# Patient Record
Sex: FEMALE | Race: WHITE
[De-identification: names, ages, dates, MRNs, and addresses within clinical notes are randomized per-mention and may not be internally consistent; named-entity substitution may affect disease eponyms.]

---

## 2018-10-17 ENCOUNTER — HOSPITAL ENCOUNTER (EMERGENCY)
Dept: HOSPITAL 63 - ER | Age: 43
Discharge: HOME | End: 2018-10-17
Payer: OTHER GOVERNMENT

## 2018-10-17 VITALS — HEIGHT: 66 IN | BODY MASS INDEX: 34.47 KG/M2 | WEIGHT: 214.51 LBS

## 2018-10-17 VITALS — SYSTOLIC BLOOD PRESSURE: 120 MMHG | DIASTOLIC BLOOD PRESSURE: 92 MMHG

## 2018-10-17 DIAGNOSIS — Z88.5: ICD-10-CM

## 2018-10-17 DIAGNOSIS — R09.89: Primary | ICD-10-CM

## 2018-10-17 DIAGNOSIS — F41.9: ICD-10-CM

## 2018-10-17 DIAGNOSIS — K21.9: ICD-10-CM

## 2018-10-17 DIAGNOSIS — R07.0: ICD-10-CM

## 2018-10-17 PROCEDURE — 70360 X-RAY EXAM OF NECK: CPT

## 2018-10-17 PROCEDURE — 99284 EMERGENCY DEPT VISIT MOD MDM: CPT

## 2018-10-17 RX ADMIN — Medication ONE ML: at 16:47

## 2018-10-17 RX ADMIN — Medication ONE ML: at 16:46

## 2018-10-17 NOTE — PHYS DOC
Past History


Past Medical History:  GERD, Other


Past Surgical History:  Appendectomy, Cholecystectomy, , Other


Alcohol Use:  None


Drug Use:  None





Adult General


Chief Complaint


Chief Complaint:  SORE THROAT





HPI


HPI





Patient is a 43 year old female who presents with staining of pain in her 

throat and feeling of food stuck in her throat since last night after eating 

chicken and mashed potato. Patient states she is able to swallow her saliva and 

liquids but feeling discomfort feeling in her throat. Patient denies history of 

food bolus.





Review of Systems


Review of Systems





Constitutional: Denies fever or chills []


Eyes: Denies change in visual acuity, redness, or eye pain []


HENT: Denies nasal congestion or sore throat []


Respiratory: Denies cough or shortness of breath []


Cardiovascular: No additional information not addressed in HPI []


GI: Denies abdominal pain, nausea, vomiting, bloody stools or diarrhea []


: Denies dysuria or hematuria []


Musculoskeletal: Denies back pain or joint pain []


Integument: Denies rash or skin lesions []


Neurologic: Denies headache, focal weakness or sensory changes []


Endocrine: Denies polyuria or polydipsia []





All other systems were reviewed and found to be within normal limits, except as 

documented in this note.





Current Medications


Current Medications





Current Medications








 Medications


  (Trade)  Dose


 Ordered  Sig/Jose  Start Time


 Stop Time Status Last Admin


Dose Admin


 


 Multi-Ingredient


 Mouthwash/Gargle


  (Gi Cocktail)  20 ml  1X  ONCE  10/17/18 17:00


 10/17/18 17:01 DC  














Allergies


Allergies





Allergies








Coded Allergies Type Severity Reaction Last Updated Verified


 


  amoxicillin Allergy Unknown  10/17/18 Yes


 


  clavulanic acid Allergy Unknown  10/17/18 Yes


 


  codeine Allergy Unknown  10/17/18 Yes











Physical Exam


Physical Exam





Constitutional: Well developed, well nourished, mild distress, non-toxic 

appearance, very anxious. []


HENT: Normocephalic, atraumatic, bilateral external ears normal, oropharynx 

moist, no oral exudates, nose normal. []


Eyes: PERRLA, EOMI, conjunctiva normal, no discharge. [] 


Neck: Normal range of motion, no tenderness, supple, no stridor. [] 


Cardiovascular:Heart rate regular rhythm, no murmur []


Lungs & Thorax:  Bilateral breath sounds clear to auscultation []


Abdomen: Bowel sounds normal, soft, no tenderness, no masses, no pulsatile 

masses. [] 


Skin: Warm, dry, no erythema, no rash. [] 


Back: No tenderness, no CVA tenderness. [] 


Extremities: No tenderness, no cyanosis, no clubbing, ROM intact, no edema. [] 


Neurologic: Alert and oriented X 3, normal motor function, normal sensory 

function, no focal deficits noted. []


Psychologic: Affect anxious, judgement normal, mood normal. []





Current Patient Data


Vital Signs





 Vital Signs








  Date Time  Temp Pulse Resp B/P (MAP) Pulse Ox O2 Delivery O2 Flow Rate FiO2


 


10/17/18 16:13 97.3 118 16  98 Room Air  











EKG


EKG


[]





Radiology/Procedures


Radiology/Procedures


 [facility] [address], [place] [address]


 [phone]


 


 IMAGING REPORT





 Signed





PATIENT: CHRIS PLAZA ACCOUNT: [id number] MRN#: [id number]


: 1975 LOCATION: ER AGE: 43


SEX: F EXAM DT: 10/17/18 ACCESSION#: [id number]


STATUS: REG ER ORD. PHYSICIAN: GRIFFIN PINO MD 


REASON: foreign body sensation


PROCEDURE: NECK SOFT TISSUE





History:  Foreign body sensation of food from last night around jugular 


notch.


 


Comparison:  None.


 


Findings:


 


AP and lateral views of the neck with attention to the soft tissues.  No 


radiopaque foreign body is seen.  No focal soft tissue swelling is 


appreciated.  Airway appears patent.  There is straightening of the normal


cervical lordosis.  Degenerative disc disease is seen at C5-6.


 


Impression:


No acute radiographic abnormality identified.


 


Electronically signed by: Jed Mckinnon MD (10/17/2018 5:05 PM) [facility]














DICTATED AND SIGNED BY:     JED MCKINNON MD


DATE:     10/17/18 5627





CC: GRIFFIN PINO MD; PCP,NO ~





Course & Med Decision Making


Course & Med Decision Making


Pertinent  Imaging studies reviewed. (See chart for details)





Evaluation of patient in ER showed 43-year-old female patient with complaining 

of esophageal foreign body sensation since last night. Patient was able to 

swallow her saliva and liquids. Patient refused to take GI cocktail in ER. 

Patient was anxious and walking in the room and looking at cardiac monitor 

constantly. Patient informed about negative evaluation for food bolus and needs 

to take liquid diet and over-the-counter Maalox.





Dragon Disclaimer


Dragon Disclaimer


This electronic medical record was generated, in whole or in part, using a 

voice recognition dictation system.





Departure


Departure:


Impression:  


 Primary Impression:  


 Sensation of foreign body in esophagus


 Additional Impression:  


 Anxiety


Disposition:   HOME, SELF-CARE (at 1719)


Condition:  STABLE


Referrals:  


PCP,NO (PCP)


Patient Instructions:  Dysphagia, Dysphagia Diet Level 1, Pureed





Additional Instructions:  


Drink plenty of liquids


Follow-up with your primary care physician in 3-5 days


Return to ER if not getting better


Take over-the-counter Maalox as needed for pain





Problem Qualifiers











GRIFFIN PINO MD Oct 17, 2018 17:20

## 2018-10-17 NOTE — RAD
History:  Foreign body sensation of food from last night around jugular 

notch.

 

Comparison:  None.

 

Findings:

 

AP and lateral views of the neck with attention to the soft tissues.  No 

radiopaque foreign body is seen.  No focal soft tissue swelling is 

appreciated.  Airway appears patent.  There is straightening of the normal

cervical lordosis.  Degenerative disc disease is seen at C5-6.

 

Impression:

No acute radiographic abnormality identified.

 

Electronically signed by: Jed Mckinnon MD (10/17/2018 5:05 PM) [facility]

## 2019-10-26 ENCOUNTER — HOSPITAL ENCOUNTER (EMERGENCY)
Dept: HOSPITAL 63 - ER | Age: 44
Discharge: HOME | End: 2019-10-26
Payer: OTHER GOVERNMENT

## 2019-10-26 VITALS
SYSTOLIC BLOOD PRESSURE: 131 MMHG | BODY MASS INDEX: 34.47 KG/M2 | HEIGHT: 66 IN | DIASTOLIC BLOOD PRESSURE: 90 MMHG | WEIGHT: 214.51 LBS

## 2019-10-26 DIAGNOSIS — H69.82: Primary | ICD-10-CM

## 2019-10-26 DIAGNOSIS — I88.9: ICD-10-CM

## 2019-10-26 DIAGNOSIS — Z88.5: ICD-10-CM

## 2019-10-26 DIAGNOSIS — K21.9: ICD-10-CM

## 2019-10-26 DIAGNOSIS — Z88.1: ICD-10-CM

## 2019-10-26 DIAGNOSIS — Z90.89: ICD-10-CM

## 2019-10-26 PROCEDURE — 99283 EMERGENCY DEPT VISIT LOW MDM: CPT

## 2019-10-26 NOTE — PHYS DOC
Past History


Past Medical History:  GERD, Other


Past Surgical History:  Appendectomy, Cholecystectomy, , Other


Alcohol Use:  None


Drug Use:  None





Adult General


Chief Complaint


Chief Complaint:  EARACHE/EAR PAIN





HPI


HPI


Patient is a 44-year-old female who presents with complaint of left ear pain and

muffled hearing for the last few days. She also indicates that she has some 

swelling in her left lower jaw and it is tender in that area. She denies any 

actual toothache however. Patient denies any fever. She rates pain as being 

moderate. She states that she came in because she is going on a plane trip and a

few days and wants to make sure that she is feeling better by then.[]





Review of Systems


Review of Systems





Constitutional: Denies fever or chills []


HENT: Positive left ear pain[]


Respiratory: Denies cough or shortness of breath []


Cardiovascular: No additional information not addressed in HPI []


Integument: Denies rash or skin lesions []


Neurologic: Denies headache, focal weakness or sensory changes []





Allergies


Allergies





Allergies








Coded Allergies Type Severity Reaction Last Updated Verified


 


  amoxicillin Allergy Unknown  10/17/18 Yes


 


  clavulanic acid Allergy Unknown  10/17/18 Yes


 


  codeine Allergy Unknown  10/17/18 Yes











Physical Exam


Physical Exam





Constitutional: Well developed, well nourished, no acute distress, non-toxic 

appearance. []


HENT: Normocephalic, atraumatic, left TM is slightly retracted with small fluid 

level, oropharynx moist, no oral exudates, nose normal. []


Neck: Normal range of motion, no tenderness, supple, with left sided posterior 

cervical adenopathy that is tender. [] 


Cardiovascular: Regular rate and rhythm[]


Lungs & Thorax:  Bilateral breath sounds clear to auscultation []


Abdomen: Bowel sounds normal, soft, no tenderness. [] 


Skin: Warm, dry, no erythema, no rash. [] 


Neurologic: Alert and oriented X 3, no focal deficits noted. []





EKG


EKG


[]





Radiology/Procedures


Radiology/Procedures


[]





Course & Med Decision Making


Course & Med Decision Making


Pertinent Labs and Imaging studies reviewed. (See chart for details)





[]





Dragon Disclaimer


Dragon Disclaimer


This electronic medical record was generated, in whole or in part, using a voice

 recognition dictation system.





Departure


Departure:


Impression:  


   Primary Impression:  


   Otalgia of left ear


   Additional Impressions:  


   Acute dysfunction of left eustachian tube


   Lymphadenitis


Disposition:  01 HOME, SELF-CARE


Condition:  STABLE


Referrals:  


PCP,NO (PCP)


Patient Instructions:  Cervical Adenitis, Otalgia


Scripts


Triamcinolone Acetonide (NASACORT) 10.8 Ml [place]


2 SPR NS QHS for congestion, #10.8 ML 0 Refills


   Prov: JAYMIE RICHARD Jr. DO         10/26/19 


Methylprednisolone (MEDROL) 4 Mg Tab.ds.pk


1 PKG PO UD for inflammation, #1 PKG


   Prov: JAYMIE RICHARD Jr. DO         10/26/19 


Cefdinir (CEFDINIR) 300 Mg Capsule


1 CAP PO BID for infection, #20 CAP


   Prov: JAYMIE RICHARD Jr. DO         10/26/19





Problem Qualifiers











JAYMIE RICHARD Jr. DO          Oct 26, 2019 10:09

## 2019-11-07 ENCOUNTER — HOSPITAL ENCOUNTER (EMERGENCY)
Dept: HOSPITAL 63 - ER | Age: 44
Discharge: HOME | End: 2019-11-07
Payer: OTHER GOVERNMENT

## 2019-11-07 VITALS — SYSTOLIC BLOOD PRESSURE: 155 MMHG | DIASTOLIC BLOOD PRESSURE: 81 MMHG

## 2019-11-07 DIAGNOSIS — J06.9: Primary | ICD-10-CM

## 2019-11-07 DIAGNOSIS — B97.89: ICD-10-CM

## 2019-11-07 DIAGNOSIS — Z88.5: ICD-10-CM

## 2019-11-07 DIAGNOSIS — K21.9: ICD-10-CM

## 2019-11-07 DIAGNOSIS — Z88.1: ICD-10-CM

## 2019-11-07 DIAGNOSIS — I10: ICD-10-CM

## 2019-11-07 LAB
INFLUENZA A PATIENT: NEGATIVE
INFLUENZA B PATIENT: NEGATIVE

## 2019-11-07 PROCEDURE — 87804 INFLUENZA ASSAY W/OPTIC: CPT

## 2019-11-07 PROCEDURE — 71046 X-RAY EXAM CHEST 2 VIEWS: CPT

## 2019-11-07 PROCEDURE — 99285 EMERGENCY DEPT VISIT HI MDM: CPT

## 2019-11-07 NOTE — PHYS DOC
Past History


Past Medical History:  GERD, Hypertension, Other


Past Surgical History:  Appendectomy, Cholecystectomy, , Other


Alcohol Use:  None


Drug Use:  None





Adult General


Chief Complaint


Chief Complaint:  FLU SYMPTOM





HPI


HPI


44-year-old female presents with cough, body aches, hoarse voice for the last 2 

days. Patient was out of the country when she started to get sick. She was in 

[place]. She didn't get her flu shot 2 and half weeks ago. She has not had a 

measured fever at home.





Review of Systems


Review of Systems





Constitutional: Denies fever or chills []


Eyes: Denies change in visual acuity, redness, or eye pain []


HENT: Denies nasal congestion or sore throat []


Respiratory: Cough without shortness of breath []


Cardiovascular: No additional information not addressed in HPI []


GI: Denies abdominal pain, nausea, vomiting, bloody stools or diarrhea []


: Denies dysuria or hematuria []


Musculoskeletal: Denies back pain or joint pain []


Integument: Denies rash or skin lesions []


Neurologic: Denies headache, focal weakness or sensory changes []


Endocrine: Denies polyuria or polydipsia []





All other systems were reviewed and found to be within normal limits, except as 

documented in this note.





Allergies


Allergies





Allergies








Coded Allergies Type Severity Reaction Last Updated Verified


 


  amoxicillin Allergy Unknown  10/17/18 Yes


 


  clavulanic acid Allergy Unknown  10/17/18 Yes


 


  codeine Allergy Unknown  10/17/18 Yes











Physical Exam


Physical Exam





Constitutional: Well developed, obese, well nourished, no acute distress, non-

toxic appearance. []


HENT: Normocephalic, atraumatic, bilateral external ears normal, oropharynx 

moist, no oral exudates, nose normal. Worse voice []


Eyes: PERRLA, EOMI, conjunctiva normal, no discharge. [] 


Neck: Normal range of motion, no tenderness, supple, no stridor. [] 


Cardiovascular: Heart rate regular rhythm, no murmur []


Lungs & Thorax:  Bilateral breath sounds clear to auscultation []


Abdomen: Bowel sounds normal, soft, no tenderness, no masses, no pulsatile 

masses. [] 


Skin: Warm, dry, no erythema, no rash. [] 


Back: No tenderness, no CVA tenderness. [] 


Extremities: No tenderness, no cyanosis, no clubbing, ROM intact, no edema. [] 


Neurologic: Alert and oriented X 3, normal motor function, normal sensory 

function, no focal deficits noted. []


Psychologic: Affect normal, judgement normal, mood normal. []





EKG


EKG


[]





Radiology/Procedures


Radiology/Procedures


[]


Impressions:


CHEST PA   LATERAL


 


History: Fever cough


 


Comparison: None.


 


Findings:


No consolidation or pleural effusion. Normal heart size. Surgical clips 


upper abdomen.


 


Impression: 


1.  No acute cardiopulmonary process.


 


Electronically signed by: Parvin Andino DO (2019 4:57 PM) [facility]














DICTATED AND SIGNED BY:     PARVIN ANDINO DO


DATE:     19





CC: EVA CRAWFORD DO; PCP,BAYLEE ~





Course & Med Decision Making


Course & Med Decision Making


Pertinent Labs and Imaging studies reviewed. (See chart for details)


The patient's x-ray is negative for pneumonia. Her influenza was negative. This 

is likely viral URI with cough. I have advised supportive care. She is stable 

for discharge at this time.


[]





Dragon Disclaimer


Dragon Disclaimer


This electronic medical record was generated, in whole or in part, using a voice

 recognition dictation system.





Departure


Departure:


Impression:  


   Primary Impression:  


   Viral URI with cough


Disposition:   HOME, SELF-CARE


Condition:  STABLE


Referrals:  


PCPBAYLEE (PCP)


Patient Instructions:  Upper Respiratory Infection, Adult, Easy-to-Read











EVA CRAWFORD DO                  2019 16:23

## 2019-11-07 NOTE — RAD
CHEST PA   LATERAL

 

History: Fever cough

 

Comparison: None.

 

Findings:

No consolidation or pleural effusion. Normal heart size. Surgical clips 

upper abdomen.

 

Impression: 

1.  No acute cardiopulmonary process.

 

Electronically signed by: Noe Paul DO (11/7/2019 4:57 PM) [facility]

## 2020-01-07 ENCOUNTER — HOSPITAL ENCOUNTER (EMERGENCY)
Dept: HOSPITAL 63 - ER | Age: 45
Discharge: HOME | End: 2020-01-07
Payer: OTHER GOVERNMENT

## 2020-01-07 VITALS — DIASTOLIC BLOOD PRESSURE: 88 MMHG | SYSTOLIC BLOOD PRESSURE: 143 MMHG

## 2020-01-07 VITALS — BODY MASS INDEX: 35.47 KG/M2 | WEIGHT: 220.68 LBS | HEIGHT: 66 IN
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
## 2020-01-07 DIAGNOSIS — K21.9: ICD-10-CM
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
## 2020-01-07 DIAGNOSIS — Z88.5: ICD-10-CM

## 2020-01-07 DIAGNOSIS — S60.222A: Primary | ICD-10-CM
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
## 2020-01-07 DIAGNOSIS — I10: ICD-10-CM

## 2020-01-07 DIAGNOSIS — Z88.1: ICD-10-CM

## 2020-01-07 PROCEDURE — 73130 X-RAY EXAM OF HAND: CPT

## 2020-01-07 PROCEDURE — 99284 EMERGENCY DEPT VISIT MOD MDM: CPT

## 2020-01-07 NOTE — RAD
EXAM: Left hand, 3 views.

 

HISTORY: Fall.

 

COMPARISON: None.

 

FINDINGS: 3 views of the left hand are obtained. There is no fracture, 

dislocation or subluxation.

 

IMPRESSION: No acute osseous finding.

 

Electronically signed by: Lavonne Ratliff MD (1/7/2020 2:02 PM) [place] [id number]

## 2020-01-07 NOTE — PHYS DOC
Past History


Past Medical History:  GERD, Hypertension


Past Surgical History:  Appendectomy, Cholecystectomy


Alcohol Use:  None


Drug Use:  None





Adult General


Chief Complaint


Chief Complaint:  HAND PROBLEM





HPI


HPI


44-year-old female presents with left hand pain. She was running down her stairs

to meet a  when she missed the last stair and fell on her 

outstretched hand. She has pain in the fifth digit and proximal medial hand. He 

is tender to the touch. She is concerned about a fracture. She denies any other 

injuries or complaints





Review of Systems


Review of Systems





Constitutional: Denies fever or chills []


Eyes: Denies change in visual acuity, redness, or eye pain []


HENT: Denies nasal congestion or sore throat []


Respiratory: Denies cough or shortness of breath []


Cardiovascular: No additional information not addressed in HPI []


GI: Denies abdominal pain, nausea, vomiting, bloody stools or diarrhea []


: Denies dysuria or hematuria []


Musculoskeletal: Left medial hand pain[]


Integument: Denies rash or skin lesions []


Neurologic: Denies headache, focal weakness or sensory changes []


Endocrine: Denies polyuria or polydipsia []





All other systems were reviewed and found to be within normal limits, except as 

documented in this note.





Current Medications


Current Medications





Current Medications








 Medications


  (Trade)  Dose


 Ordered  Sig/Jose  Start Time


 Stop Time Status Last Admin


Dose Admin


 


 Sodium Chloride  1,000 ml @ 


 1,000 mls/hr  1X  ONCE  1/7/20 13:15


 1/7/20 13:12 DC  














Allergies


Allergies





Allergies








Coded Allergies Type Severity Reaction Last Updated Verified


 


  amoxicillin Allergy Unknown  10/17/18 Yes


 


  clavulanic acid Allergy Unknown  10/17/18 Yes


 


  codeine Allergy Unknown  10/17/18 Yes











Physical Exam


Physical Exam





Constitutional: Well developed, well nourished, no acute distress, non-toxic 

appearance. []


HENT: Normocephalic, atraumatic, bilateral external ears normal, oropharynx 

moist, no oral exudates, nose normal. []


Eyes: PERRLA, EOMI, conjunctiva normal, no discharge. [] 


Neck: Normal range of motion, no tenderness, supple, no stridor. [] 


Cardiovascular:Heart rate regular rhythm, no murmur []


Lungs & Thorax:  Bilateral breath sounds clear to auscultation []


Abdomen: Bowel sounds normal, soft, no tenderness, no masses, no pulsatile 

masses. [] 


Skin: Warm, dry, no erythema, no rash. [] 


Back: No tenderness, no CVA tenderness. [] 


Extremities: Tenderness over the fifth metacarpal left hand, mild ecchymosis, no

 swelling or obvious deformity.[] 


Neurologic: Alert and oriented X 3, normal motor function, normal sensory 

function, no focal deficits noted. []


Psychologic: Affect normal, judgement normal, mood normal. []





EKG


EKG


[]





Radiology/Procedures


Radiology/Procedures


[]





Course & Med Decision Making


Course & Med Decision Making


Pertinent Labs and Imaging studies reviewed. (See chart for details)


The patient's x-rays negative for fracture. She does has a hand contusion. I 

advised supportive care such as rest, ice, ibuprofen, and Tylenol. She is stable

 for discharge at this time.


[]





Dragon Disclaimer


Dragon Disclaimer


This electronic medical record was generated, in whole or in part, using a voice

 recognition dictation system.





Departure


Departure:


Impression:  


   Primary Impression:  


   Contusion of hand, left


Disposition:  01 HOME, SELF-CARE


Condition:  STABLE


Referrals:  


KAYDEN SAVAGE MD (PCP)


Patient Instructions:  Hand Contusion, Easy-to-Read





Problem Qualifiers








   Primary Impression:  


   Contusion of hand, left


   Encounter type:  initial encounter  Qualified Codes:  S60.222A - Contusion of

    left hand, initial encounter








EVA CRAWFORD DO                  Jan 7, 2020 13:47

## 2020-03-31 ENCOUNTER — HOSPITAL ENCOUNTER (EMERGENCY)
Dept: HOSPITAL 63 - ER | Age: 45
Discharge: HOME | End: 2020-03-31
Payer: OTHER GOVERNMENT

## 2020-03-31 VITALS — HEIGHT: 66 IN | BODY MASS INDEX: 36.42 KG/M2 | WEIGHT: 226.64 LBS

## 2020-03-31 VITALS — DIASTOLIC BLOOD PRESSURE: 61 MMHG | SYSTOLIC BLOOD PRESSURE: 154 MMHG

## 2020-03-31 DIAGNOSIS — Z90.49: ICD-10-CM

## 2020-03-31 DIAGNOSIS — I10: ICD-10-CM

## 2020-03-31 DIAGNOSIS — M54.5: ICD-10-CM

## 2020-03-31 DIAGNOSIS — Z88.8: ICD-10-CM

## 2020-03-31 DIAGNOSIS — Z88.1: ICD-10-CM

## 2020-03-31 DIAGNOSIS — R35.0: ICD-10-CM

## 2020-03-31 DIAGNOSIS — K21.9: ICD-10-CM

## 2020-03-31 DIAGNOSIS — Z90.89: ICD-10-CM

## 2020-03-31 DIAGNOSIS — R10.32: Primary | ICD-10-CM

## 2020-03-31 LAB
ALBUMIN SERPL-MCNC: 4 G/DL (ref 3.4–5)
ALBUMIN/GLOB SERPL: 1 {RATIO} (ref 1–1.7)
ALP SERPL-CCNC: 148 U/L (ref 46–116)
ALT SERPL-CCNC: 24 U/L (ref 14–59)
ANION GAP SERPL CALC-SCNC: 9 MMOL/L (ref 6–14)
APTT PPP: YELLOW S
AST SERPL-CCNC: 16 U/L (ref 15–37)
BACTERIA #/AREA URNS HPF: (no result) /HPF
BASOPHILS # BLD AUTO: 0.1 X10^3/UL (ref 0–0.2)
BASOPHILS NFR BLD: 1 % (ref 0–3)
BILIRUB SERPL-MCNC: 0.1 MG/DL (ref 0.2–1)
BILIRUB UR QL STRIP: (no result)
BUN/CREAT SERPL: 16 (ref 6–20)
CA-I SERPL ISE-MCNC: 14 MG/DL (ref 7–20)
CALCIUM SERPL-MCNC: 8.6 MG/DL (ref 8.5–10.1)
CHLORIDE SERPL-SCNC: 104 MMOL/L (ref 98–107)
CO2 SERPL-SCNC: 27 MMOL/L (ref 21–32)
CREAT SERPL-MCNC: 0.9 MG/DL (ref 0.6–1)
EOSINOPHIL NFR BLD: 0.2 X10^3/UL (ref 0–0.7)
EOSINOPHIL NFR BLD: 2 % (ref 0–3)
ERYTHROCYTE [DISTWIDTH] IN BLOOD BY AUTOMATED COUNT: 13.4 % (ref 11.5–14.5)
FIBRINOGEN PPP-MCNC: CLEAR MG/DL
GFR SERPLBLD BASED ON 1.73 SQ M-ARVRAT: 67.1 ML/MIN
GLOBULIN SER-MCNC: 3.9 G/DL (ref 2.2–3.8)
GLUCOSE SERPL-MCNC: 126 MG/DL (ref 70–99)
GLUCOSE UR STRIP-MCNC: (no result) MG/DL
HCT VFR BLD CALC: 44.4 % (ref 36–47)
HGB BLD-MCNC: 14.8 G/DL (ref 12–15.5)
LIPASE: 180 U/L (ref 73–393)
LYMPHOCYTES # BLD: 3.4 X10^3/UL (ref 1–4.8)
LYMPHOCYTES NFR BLD AUTO: 33 % (ref 24–48)
MAGNESIUM SERPL-MCNC: 1.9 MG/DL (ref 1.8–2.4)
MCH RBC QN AUTO: 29 PG (ref 25–35)
MCHC RBC AUTO-ENTMCNC: 33 G/DL (ref 31–37)
MCV RBC AUTO: 87 FL (ref 79–100)
MONO #: 0.8 X10^3/UL (ref 0–1.1)
MONOCYTES NFR BLD: 8 % (ref 0–9)
NEUT #: 5.8 X10^3UL (ref 1.8–7.7)
NEUTROPHILS NFR BLD AUTO: 56 % (ref 31–73)
NITRITE UR QL STRIP: (no result)
PLATELET # BLD AUTO: 300 X10^3/UL (ref 140–400)
POTASSIUM SERPL-SCNC: 3.7 MMOL/L (ref 3.5–5.1)
PROT SERPL-MCNC: 7.9 G/DL (ref 6.4–8.2)
RBC # BLD AUTO: 5.11 X10^6/UL (ref 3.5–5.4)
RBC #/AREA URNS HPF: 0 /HPF (ref 0–2)
SODIUM SERPL-SCNC: 140 MMOL/L (ref 136–145)
SP GR UR STRIP: 1.02
SQUAMOUS #/AREA URNS LPF: (no result) /LPF
UROBILINOGEN UR-MCNC: 0.2 MG/DL
WBC # BLD AUTO: 10.3 X10^3/UL (ref 4–11)
WBC #/AREA URNS HPF: (no result) /HPF (ref 0–4)

## 2020-03-31 PROCEDURE — 76856 US EXAM PELVIC COMPLETE: CPT

## 2020-03-31 PROCEDURE — 80053 COMPREHEN METABOLIC PANEL: CPT

## 2020-03-31 PROCEDURE — 74176 CT ABD & PELVIS W/O CONTRAST: CPT

## 2020-03-31 PROCEDURE — 36415 COLL VENOUS BLD VENIPUNCTURE: CPT

## 2020-03-31 PROCEDURE — 83735 ASSAY OF MAGNESIUM: CPT

## 2020-03-31 PROCEDURE — 85025 COMPLETE CBC W/AUTO DIFF WBC: CPT

## 2020-03-31 PROCEDURE — 81001 URINALYSIS AUTO W/SCOPE: CPT

## 2020-03-31 PROCEDURE — 83690 ASSAY OF LIPASE: CPT

## 2020-03-31 NOTE — RAD
CT abdomen pelvis without contrast.

 

HISTORY: Lower quadrant pain, dark urine

 

CT scan the abdomen pelvis was done without contrast. Lung bases are clear

except for linear scarring or atelectasis without other infiltrates. 

Patient's had a cholecystectomy. A liver lesion is not identified. Spleen 

and adrenal glands are normal. A pancreatic lesion is not identified. 

There is no mass or hydronephrosis in the kidneys. There is no bowel 

obstruction. Patient's had an appendectomy. Uterus and ovaries are 

unremarkable. There is no renal or ureteral calculus. There is not 

evidence of a diverticulitis. There is spurring and an old central disc 

bulge or protrusion at L1-2. There are mild degenerative changes at other 

levels in the lumbar spine. There is no acute lumbar fracture or spinal 

stenosis. There is bulging of the disc central protrusion at L4-5.

 

IMPRESSION:

1. No renal or ureteral calculus noted.

2. No bowel obstruction or acute finding noted in the abdomen or pelvis.

 

 

 

 

 

 

 

 

 

PQRS Compliance Statement:

 

One or more of the following individualized dose reduction techniques were

utilized for this examination:  

1. Automated exposure control  

2. Adjustment of the mA and/or kV according to patient size  

3. Use of iterative reconstruction technique

 

Electronically signed by: James Ramirez MD (3/31/2020 4:20 PM) [id number]

## 2020-03-31 NOTE — RAD
Transabdominal sonography of the pelvis

 

Clinical indications: Left-sided pelvic pain.

 

FINDINGS: The uterus is anteverted in position. The longitudinal and AP 

and transverse dimensions of the uterus are 7.2 cm and 3.1 cm and 3.8 cm 

respectively. The thickness of the endometrial canal is 1.3 mm. No uterine

mass or fibroid is seen. No free fluid is seen within the cul-de-sac. The 

left ovary measures 2.3 cm and 1.6 cm and 1.7 cm in size and is normal. 

The right ovary measures 2.0 cm and 1.5 cm and 1.3 cm in size and is 

normal. Color Doppler flow is seen within both ovaries. No adnexal mass is

seen.

 

IMPRESSION: Unremarkable study.

 

Electronically signed by: Louie Mishra MD (3/31/2020 4:15 PM) [id number]

## 2020-03-31 NOTE — PHYS DOC
Past History


Past Medical History:  Arrhythmia, GERD, Hypertension


Additional Past Medical Histor:  PE


Past Surgical History:  Appendectomy, Cholecystectomy


Smoking:  Non-smoker


Alcohol Use:  None


Drug Use:  None





Adult General


Chief Complaint


Chief Complaint:  ABDOMINAL PAIN





HPI


HPI





45-year-old female presents with report of left lower quadrant abdominal pain 

that is been ongoing for the past few days with radiation to her back. Denies 

nausea or vomiting. Denies diarrhea or constipation. Denies rash. Denies trauma.

Patient reports she feels that she is having "ovarian pain". Denies prior 

history of ovarian cysts. Reports pain has been fairly well controlled with 

over-the-counter ibuprofen but returns as soon as the ibuprofen wears off. 

Reports she had taken ibuprofen prior to arrival.  Denies fever or chills. 

Patient does report some increased urinary frequency. Patient does report seeing

urgent care 2 days ago and was diagnosed with a urinary tract infection. Patient

was started on Macrobid.





Review of Systems


Review of Systems





Constitutional: Denies fever or chills 


Eyes: Denies redness or eye pain 


HENT: Denies nasal congestion or sore throat


Respiratory: Denies cough or shortness of breath 


Cardiovascular: Denies chest pain or palpitations


GI: Reports left lower quadrant abdominal pain; denies nausea, vomiting, 

diarrhea, or constipation


: Denies hematuria; reports urinary frequency


Musculoskeletal: Reports left back pain; denies joint pain


Integument: Denies rash or skin lesions 


Neurologic: Denies headache, focal weakness or sensory changes





Complete systems were reviewed and found to be within normal limits, except as 

documented in this note.





Current Medications


Current Medications





Current Medications








 Medications


  (Trade)  Dose


 Ordered  Sig/[facility]  Start Time


 Stop Time Status Last Admin


Dose Admin


 


 Sodium Chloride  1,000 ml @ 


 1,000 mls/hr  1X  ONCE  3/31/20 15:30


 3/31/20 16:29 DC 3/31/20 15:30


1,000 MLS/HR











Allergies


Allergies





Allergies








Coded Allergies Type Severity Reaction Last Updated Verified


 


  amoxicillin Allergy Unknown  10/17/18 Yes


 


  clavulanic acid Allergy Unknown  10/17/18 Yes


 


  codeine Allergy Unknown  10/17/18 Yes











Physical Exam


Physical Exam





Constitutional: Well developed, well nourished, no acute distress, non-toxic 

appearance


HENT: Normocephalic, atraumatic, oropharynx moist


Eyes: Conjunctiva normal, no discharge


Neck: Normal range of motion, no tenderness, supple


Cardiovascular: Heart rate normal, regular rhythm


Lungs & Thorax:  Bilateral breath sounds clear to auscultation, no wheezing


Abdomen: Soft, left lower quadrant tenderness, no guarding/rebound 

tenderness/distention


Skin: Warm, dry, no erythema, no rash


Back: No midline tenderness, left paraspinal tenderness, left CVA tenderness


Extremities: No tenderness, ROM intact, no edema


Neurologic: Alert and oriented X 3, no focal deficits noted


Psychologic: Affect normal, judgment normal





Current Patient Data


Vital Signs





                                   Vital Signs








  Date Time  Temp Pulse Resp B/P (MAP) Pulse Ox O2 Delivery O2 Flow Rate FiO2


 


3/31/20 14:55 99.1 97 16 128/80 (96) 98 Room Air  








Lab Results





                                Laboratory Tests








Test


 3/31/20


15:08 3/31/20


15:30


 


Urine Collection Type Unknown   


 


Urine Color Yellow   


 


Urine Clarity Clear   


 


Urine pH 5.5   


 


Urine Specific Gravity 1.025   


 


Urine Protein


 Neg


(NEG-TRACE) 





 


Urine Glucose (UA)


 Neg mg/dL


(NEG) 





 


Urine Ketones (Stick)


 Trace mg/dL


(NEG) 





 


Urine Blood Neg (NEG)   


 


Urine Nitrite Neg (NEG)   


 


Urine Bilirubin Neg (NEG)   


 


Urine Urobilinogen Dipstick


 0.2 mg/dL (0.2


mg/dL) 





 


Urine Leukocyte Esterase Neg (NEG)   


 


Urine RBC 0 /HPF (0-2)   


 


Urine WBC


 1-4 /HPF (0-4)


 





 


Urine Squamous Epithelial


Cells Mod /LPF  


 





 


Urine Bacteria


 Few /HPF


(0-FEW) 





 


Urine Mucus Mod /LPF   


 


White Blood Count


 


 10.3 x10^3/uL


(4.0-11.0)


 


Red Blood Count


 


 5.11 x10^6/uL


(3.50-5.40)


 


Hemoglobin


 


 14.8 g/dL


(12.0-15.5)


 


Hematocrit


 


 44.4 %


(36.0-47.0)


 


Mean Corpuscular Volume


 


 87 fL ()





 


Mean Corpuscular Hemoglobin  29 pg (25-35)  


 


Mean Corpuscular Hemoglobin


Concent 


 33 g/dL


(31-37)


 


Red Cell Distribution Width


 


 13.4 %


(11.5-14.5)


 


Platelet Count


 


 300 x10^3/uL


(140-400)


 


Neutrophils (%) (Auto)  56 % (31-73)  


 


Lymphocytes (%) (Auto)  33 % (24-48)  


 


Monocytes (%) (Auto)  8 % (0-9)  


 


Eosinophils (%) (Auto)  2 % (0-3)  


 


Basophils (%) (Auto)  1 % (0-3)  


 


Neutrophils # (Auto)


 


 5.8 x10^3uL


(1.8-7.7)


 


Lymphocytes # (Auto)


 


 3.4 x10^3/uL


(1.0-4.8)


 


Monocytes # (Auto)


 


 0.8 x10^3/uL


(0.0-1.1)


 


Eosinophils # (Auto)


 


 0.2 x10^3/uL


(0.0-0.7)


 


Basophils # (Auto)


 


 0.1 x10^3/uL


(0.0-0.2)











EKG


EKG


[]





Radiology/Procedures


Radiology/Procedures


PROCEDURE: US PELVIS





Transabdominal sonography of the pelvis


 


Clinical indications: Left-sided pelvic pain.


 


FINDINGS: The uterus is anteverted in position. The longitudinal and AP 


and transverse dimensions of the uterus are 7.2 cm and 3.1 cm and 3.8 cm 


respectively. The thickness of the endometrial canal is 1.3 mm. No uterine


mass or fibroid is seen. No free fluid is seen within the cul-de-sac. The 


left ovary measures 2.3 cm and 1.6 cm and 1.7 cm in size and is normal. 


The right ovary measures 2.0 cm and 1.5 cm and 1.3 cm in size and is 


normal. Color Doppler flow is seen within both ovaries. No adnexal mass is


seen.


 


IMPRESSION: Unremarkable study.


 


Electronically signed by: Louie Mishra MD (3/31/2020 4:15 PM) [id number]





PROCEDURE: CT ABDOMEN PELVIS WO CONTRAST





CT abdomen pelvis without contrast.


 


HISTORY: Lower quadrant pain, dark urine


 


CT scan the abdomen pelvis was done without contrast. Lung bases are clear


except for linear scarring or atelectasis without other infiltrates. 


Patient's had a cholecystectomy. A liver lesion is not identified. Spleen 


and adrenal glands are normal. A pancreatic lesion is not identified. 


There is no mass or hydronephrosis in the kidneys. There is no bowel 


obstruction. Patient's had an appendectomy. Uterus and ovaries are 


unremarkable. There is no renal or ureteral calculus. There is not 


evidence of a diverticulitis. There is spurring and an old central disc 


bulge or protrusion at L1-2. There are mild degenerative changes at other 


levels in the lumbar spine. There is no acute lumbar fracture or spinal 


stenosis. There is bulging of the disc central protrusion at L4-5.


 


IMPRESSION:


1. No renal or ureteral calculus noted.


2. No bowel obstruction or acute finding noted in the abdomen or pelvis.


  


PQRS Compliance Statement:


 


One or more of the following individualized dose reduction techniques were


utilized for this examination:  


1. Automated exposure control  


2. Adjustment of the mA and/or kV according to patient size  


3. Use of iterative reconstruction technique


 


Electronically signed by: James Ramirez MD (3/31/2020 4:20 PM) [id number]





Course & Med Decision Making


Course & Med Decision Making


Pertinent Labs and Imaging studies reviewed. (See chart for details)





Patient presents with report of left lower quadrant abdominal pain with 

radiation to back. History of recently diagnosed UA for which patient is 

currently taking Macrobid. Labs obtained and posted to chart.  UA without 

current signs of infection.  Patient advised to continue previously prescribed 

antibiotics. CT abdomen/pelvis without acute process. Pelvic ultrasound also 

without acute process.





Patient stable for discharge with outpatient follow-up with PCP. Discussed 

findings and plan with patient, who acknowledges understanding and agreement.





Dragon Disclaimer


Dragon Disclaimer


This electronic medical record was generated, in whole or in part, using a voice

 recognition dictation system.





Departure


Departure:


Impression:  


   Primary Impression:  


   Abdominal pain


   Additional Impression:  


   Back pain


Disposition:  01 HOME, SELF-CARE


Condition:  STABLE


Referrals:  


KAYDEN SAVAGE MD (PCP)


Patient Instructions:  Abdominal Pain (Nonspecific), Back Pain, Adult, 

Easy-to-Read





Additional Instructions:  


May continue over the counter Tylenol and/or Ibuprofen as needed for pain.


Scripts


Orphenadrine Citrate (ORPHENADRINE CITRATE) 100 Mg Tablet.er


1 TAB PO BID PRN for MUSCLE PAIN, #14 TAB 0 Refills


   Prov: ADELAIDA GUTIERREZ DO         3/31/20





Problem Qualifiers








   Primary Impression:  


   Abdominal pain


   Abdominal location:  left lower quadrant  Qualified Codes:  R10.32 - Left 

   lower quadrant pain


   Additional Impression:  


   Back pain


   Back pain location:  low back pain  Chronicity:  acute  Back pain laterality:

     left  Sciatica presence:  without sciatica  Qualified Codes:  M54.5 - Low 

   back pain








ADELAIDA GUTIERREZ DO             Mar 31, 2020 16:32

## 2020-06-11 ENCOUNTER — HOSPITAL ENCOUNTER (EMERGENCY)
Dept: HOSPITAL 63 - ER | Age: 45
Discharge: HOME | End: 2020-06-11
Payer: OTHER GOVERNMENT

## 2020-06-11 VITALS — HEIGHT: 66 IN | BODY MASS INDEX: 36.42 KG/M2 | WEIGHT: 226.64 LBS

## 2020-06-11 VITALS — SYSTOLIC BLOOD PRESSURE: 131 MMHG | DIASTOLIC BLOOD PRESSURE: 86 MMHG

## 2020-06-11 DIAGNOSIS — I10: ICD-10-CM

## 2020-06-11 DIAGNOSIS — Z88.5: ICD-10-CM

## 2020-06-11 DIAGNOSIS — S40.012A: ICD-10-CM

## 2020-06-11 DIAGNOSIS — K21.9: ICD-10-CM
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
## 2020-06-11 DIAGNOSIS — M25.552: ICD-10-CM
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
## 2020-06-11 DIAGNOSIS — S09.8XXA: Primary | ICD-10-CM
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
## 2020-06-11 DIAGNOSIS — Z88.1: ICD-10-CM

## 2020-06-11 PROCEDURE — 73030 X-RAY EXAM OF SHOULDER: CPT

## 2020-06-11 PROCEDURE — 70450 CT HEAD/BRAIN W/O DYE: CPT

## 2020-06-11 NOTE — RAD
EXAM: CT Head without IV contrast

 

INDICATION: Reason: fall, posterior shoulder pain / Spl. Instructions:  / 

History: 

 

TECHNIQUE: Multi-detector row CT images were obtained of the head without 

the use of IV contrast. All CT scans performed at this facility utilize 

dose optimization techniques as appropriate to the exam, including the 

following: Automated exposure control and adjustment of the mA and/or KV 

according to patient size (this includes techniques or standardized 

protocols for targeted exams where dose is indication/reason for exam).

 

COMPARISON: None

 

FINDINGS: 

 

BRAIN PARENCHYMA: No evidence of acute intraparenchymal hemorrhage or 

infarct. No abnormal parenchymal density or mass.

 

VENTRICLES & EXTRA-AXIAL SPACES:  Ventricles are within normal limits. 

Basilar cisterns are patent. No pathologic extra-axial fluid collection or

mass.

 

ORBITS: Orbital contents are unremarkable.

 

SINUSES:  Visualized paranasal sinuses and mastoid air cells are clear.

 

OSSEOUS & SOFT TISSUES:  Calvarium and skull base are intact.

 

IMPRESSION:

 

Normal CT of the head without contrast.

 

 

 

PROCEDURE: SHOULDER 2+V LEFT, CT HEAD WO CONTRAST

 

STUDY DATE: 6/11/2020

 

CLINICAL INDICATION / HISTORY: Reason: fall, posterior shoulder pain / 

Spl. Instructions:  / History: .

 

TECHNIQUE: AP internal and external rotation views with a Y- view were 

obtained.

 

COMPARISON: None

 

FINDINGS:  No fracture, dislocation or bone destruction is identified. 

There are no degenerative changes at the right AC joint. No calcifications

are seen in relation to the rotator cuff insertion. 

 

IMPRESSION:  No acute osseous abnormality.

 

Electronically signed by: Navi Webb MD (6/11/2020 9:17 AM) 

[id number]

## 2020-06-11 NOTE — PHYS DOC
Past History


Past Medical History:  Arrhythmia, GERD, Hypertension


Additional Past Medical Histor:  PE


Past Surgical History:  Appendectomy, Cholecystectomy, Tubal ligation


Smoking:  Non-smoker


Alcohol Use:  None


Drug Use:  None





General Adult


EDM:


Chief Complaint:  MECHANICAL FALL





HPI:


HPI:


Patient is a 45-year-old female who presents to the emergency department for 

evaluation.  She states she was working out at the gym, doing a pull exercise 

where she has to drag 70 pounds over a concrete floor, when she lost her footing

and fell, striking the back of her head on the concrete, and also injuring her 

left shoulder.  She also complains of some mild discomfort in her left hip area 

but is able to ambulate without significant difficulty.  Palpation and movement 

of the affected area worsens her pain.  There are no alleviating factors to her 

symptoms.  She did not lose consciousness, but states that her vision did black 

out briefly, although she was able to converse and hear people.  She denies any 

numbness, weakness, vision changes, or vomiting.





Review of Systems:


Review of Systems:


Constitutional:  Denies fever or chills 


Eyes:  Denies change in visual acuity 


HENT:  Denies nasal congestion or sore throat 


Respiratory:  Denies cough or shortness of breath 


Cardiovascular:  Denies chest pain or edema 


GI:  Denies abdominal pain, nausea, vomiting, bloody stools or diarrhea 


: Denies dysuria 


Musculoskeletal:  Denies back pain or joint pain, other than as noted in the HPI


Neurologic:  Denies focal weakness or sensory changes





Heart Score:


Risk Factors:


Risk Factors:  DM, Current or recent (<one month) smoker, HTN, HLP, family 

history of CAD, obesity.


Risk Scores:


Score 0 - 3:  2.5% MACE over next 6 weeks - Discharge Home


Score 4 - 6:  20.3% MACE over next 6 weeks - Admit for Clinical Observation


Score 7 - 10:  72.7% MACE over next 6 weeks - Early Invasive Strategies





Allergies:


Allergies:





Allergies








Coded Allergies Type Severity Reaction Last Updated Verified


 


  amoxicillin Allergy Unknown  10/17/18 Yes


 


  clavulanic acid Allergy Unknown  10/17/18 Yes


 


  codeine Allergy Unknown  10/17/18 Yes











Physical Exam:


PE:


PHYSICAL EXAM:





CONSTITUTIONAL: Well developed, well nourished


HEAD: normocephalic, there is mild soft tissue swelling and tenderness to 

palpation of the posterior scalp, the remainder the cranium is atraumatic


EENT: PERRL, EOMI. Conjunctivae normal color, sclerae non-icteric; moist mucous 

membranes.


NECK: Supple, non-tender; no meningismus.  There is full, painless range of 

motion of the cervical spine, without any focal bony midline tenderness to 

palpation.


LUNGS: Lungs CTA, breathing even and unlabored. Normal air movement.


HEART: Regular rate and rhythm, no murmur


CHEST: No deformity; non-tender


ABDOMEN: The abdomen is soft, and non-tender, no masses or bruits.


EXTREM: There is tenderness palpation the left shoulder diffusely, although full

range of motion is present, there is no gross deformity.  PMS is intact in all 

extremities.  Remainder the extremities are atraumatic, with normal ROM; no 

deformity, no calf tenderness. Normal pulses palpable in all extremities. There 

is no pedal edema.


SKIN: No rash; no diaphoresis


NEURO: Alert; normal speech and cognition; CN's grossly intact; strength grossly

intact without focal deficit.


BACK: No CVA TTP.  There is no bony tenderness to palpation of the thoracic or 

lumbar spine.





EKG:


EKG:


[]





Radiology/Procedures:


Radiology/Procedures:


PROCEDURE: CT HEAD WO CONTRAST





EXAM: CT Head without IV contrast


 


INDICATION: Reason: fall, posterior shoulder pain / Spl. Instructions:  / 


History: 


 


TECHNIQUE: Multi-detector row CT images were obtained of the head without 


the use of IV contrast. All CT scans performed at this facility utilize 


dose optimization techniques as appropriate to the exam, including the 


following: Automated exposure control and adjustment of the mA and/or KV 


according to patient size (this includes techniques or standardized 


protocols for targeted exams where dose is indication/reason for exam).


 


COMPARISON: None


 


FINDINGS: 


 


BRAIN PARENCHYMA: No evidence of acute intraparenchymal hemorrhage or 


infarct. No abnormal parenchymal density or mass.


 


VENTRICLES & EXTRA-AXIAL SPACES:  Ventricles are within normal limits. 


Basilar cisterns are patent. No pathologic extra-axial fluid collection or


mass.


 


ORBITS: Orbital contents are unremarkable.


 


SINUSES:  Visualized paranasal sinuses and mastoid air cells are clear.


 


OSSEOUS & SOFT TISSUES:  Calvarium and skull base are intact.


 


IMPRESSION:


 


Normal CT of the head without contrast.


 


 


 


PROCEDURE: SHOULDER 2+V LEFT, CT HEAD WO CONTRAST


 


STUDY DATE: 6/11/2020


 


CLINICAL INDICATION / HISTORY: Reason: fall, posterior shoulder pain / 


Spl. Instructions:  / History: .


 


TECHNIQUE: AP internal and external rotation views with a Y- view were 


obtained.


 


COMPARISON: None


 


FINDINGS:  No fracture, dislocation or bone destruction is identified. 


There are no degenerative changes at the right AC joint. No calcifications


are seen in relation to the rotator cuff insertion. 


 


IMPRESSION:  No acute osseous abnormality.[]





Course & Med Decision Making:


Course & Med Decision Making


Pertinent Imaging studies reviewed. (See chart for details)





[] Patient remains stable. I discussed test results, the need for close follow-

up, and return precautions.





Dragon Disclaimer:


Dragon Disclaimer:


This electronic medical record was generated, in whole or in part, using a voice

 recognition dictation system.





Departure


Departure:


Impression:  


   Primary Impression:  


   Closed head injury


   Additional Impression:  


   Shoulder contusion


Disposition:  01 HOME/RESIDENCE PRIOR TO ADM


Condition:  STABLE


Referrals:  


KAYDEN SAVAGE MD (PCP)


Patient Instructions:  Contusion, Head Injury, Adult





Additional Instructions:  


Tylenol as needed for pain.





Justification of Admission:


Justification of Admission:


Justification of Admission Dx:  N/A











MARCI DE LEON MD           Jun 11, 2020 08:59

## 2020-06-11 NOTE — RAD
EXAM: CT Head without IV contrast

 

INDICATION: Reason: fall, posterior shoulder pain / Spl. Instructions:  / 

History: 

 

TECHNIQUE: Multi-detector row CT images were obtained of the head without 

the use of IV contrast. All CT scans performed at this facility utilize 

dose optimization techniques as appropriate to the exam, including the 

following: Automated exposure control and adjustment of the mA and/or KV 

according to patient size (this includes techniques or standardized 

protocols for targeted exams where dose is indication/reason for exam).

 

COMPARISON: None

 

FINDINGS: 

 

BRAIN PARENCHYMA: No evidence of acute intraparenchymal hemorrhage or 

infarct. No abnormal parenchymal density or mass.

 

VENTRICLES & EXTRA-AXIAL SPACES:  Ventricles are within normal limits. 

Basilar cisterns are patent. No pathologic extra-axial fluid collection or

mass.

 

ORBITS: Orbital contents are unremarkable.

 

SINUSES:  Visualized paranasal sinuses and mastoid air cells are clear.

 

OSSEOUS & SOFT TISSUES:  Calvarium and skull base are intact.

 

IMPRESSION:

 

Normal CT of the head without contrast.

 

 

 

PROCEDURE: SHOULDER 2+V LEFT, CT HEAD WO CONTRAST

 

STUDY DATE: 6/11/2020

 

CLINICAL INDICATION / HISTORY: Reason: fall, posterior shoulder pain / 

Spl. Instructions:  / History: .

 

TECHNIQUE: AP internal and external rotation views with a Y- view were 

obtained.

 

COMPARISON: None

 

FINDINGS:  No fracture, dislocation or bone destruction is identified. 

There are no degenerative changes at the right AC joint. No calcifications

are seen in relation to the rotator cuff insertion. 

 

IMPRESSION:  No acute osseous abnormality.

 

Electronically signed by: Navi Webb MD (6/11/2020 9:17 AM) 

[id number]